# Patient Record
Sex: FEMALE | Race: WHITE | Employment: FULL TIME | URBAN - METROPOLITAN AREA
[De-identification: names, ages, dates, MRNs, and addresses within clinical notes are randomized per-mention and may not be internally consistent; named-entity substitution may affect disease eponyms.]

---

## 2017-01-23 ENCOUNTER — HOSPITAL ENCOUNTER (OUTPATIENT)
Dept: GENERAL RADIOLOGY | Age: 52
Discharge: HOME | End: 2017-01-23
Attending: INTERNAL MEDICINE
Payer: COMMERCIAL

## 2017-01-23 DIAGNOSIS — M54.50 LOW BACK PAIN: ICD-10-CM

## 2017-01-23 PROCEDURE — 72100 X-RAY EXAM L-S SPINE 2/3 VWS: CPT

## 2017-06-06 ENCOUNTER — HOSPITAL ENCOUNTER (OUTPATIENT)
Dept: GENERAL RADIOLOGY | Age: 52
Discharge: HOME | End: 2017-06-06
Payer: COMMERCIAL

## 2017-06-06 DIAGNOSIS — R05.9 COUGH: ICD-10-CM

## 2017-06-06 PROCEDURE — 71020 XR CHEST PA LAT: CPT

## 2018-04-20 ENCOUNTER — HOSPITAL ENCOUNTER (OUTPATIENT)
Dept: GENERAL RADIOLOGY | Age: 53
Discharge: HOME | End: 2018-04-20
Attending: INTERNAL MEDICINE
Payer: COMMERCIAL

## 2018-04-20 DIAGNOSIS — M53.3 PAIN IN THE COCCYX: ICD-10-CM

## 2018-04-20 PROCEDURE — 72220 X-RAY EXAM SACRUM TAILBONE: CPT

## 2019-01-25 ENCOUNTER — CLINICAL SUPPORT (OUTPATIENT)
Dept: CARDIOLOGY CLINIC | Age: 54
End: 2019-01-25

## 2019-01-25 DIAGNOSIS — R07.9 CHEST PAIN, UNSPECIFIED TYPE: ICD-10-CM

## 2019-01-25 DIAGNOSIS — R06.02 SOB (SHORTNESS OF BREATH): Primary | Chronic | ICD-10-CM

## 2019-01-30 ENCOUNTER — TELEPHONE (OUTPATIENT)
Dept: CARDIOLOGY CLINIC | Age: 54
End: 2019-01-30

## 2019-01-30 NOTE — TELEPHONE ENCOUNTER
Caryn called from [employer] stating she would like to receive the pt stress test results.   Phone #[phone]  Thanks

## 2019-01-30 NOTE — TELEPHONE ENCOUNTER
Faxed stress test results to Dr Zulema Ding at fax number [phone]. Fax confirmation received. Left message with Isai Lipoma that I will fax the results.

## 2022-04-20 ENCOUNTER — TRANSCRIBE ORDER (OUTPATIENT)
Dept: GENERAL RADIOLOGY | Age: 57
End: 2022-04-20

## 2022-04-20 DIAGNOSIS — R05.1 ACUTE COUGH: Primary | ICD-10-CM

## 2022-04-29 ENCOUNTER — TRANSCRIBE ORDER (OUTPATIENT)
Dept: GENERAL RADIOLOGY | Age: 57
End: 2022-04-29

## 2022-04-29 ENCOUNTER — HOSPITAL ENCOUNTER (OUTPATIENT)
Dept: GENERAL RADIOLOGY | Age: 57
Discharge: HOME | End: 2022-04-29
Attending: INTERNAL MEDICINE
Payer: COMMERCIAL

## 2022-04-29 DIAGNOSIS — R05.1 ACUTE COUGH: ICD-10-CM

## 2022-04-29 DIAGNOSIS — R05.1 ACUTE COUGH: Primary | ICD-10-CM

## 2022-04-29 PROCEDURE — 71046 X-RAY EXAM CHEST 2 VIEWS: CPT

## 2022-05-04 ENCOUNTER — TELEPHONE (OUTPATIENT)
Dept: CARDIOLOGY CLINIC | Age: 57
End: 2022-05-04

## 2022-05-04 NOTE — TELEPHONE ENCOUNTER
----- Message from Sanchez Edwards RN sent at 5/4/2022 11:18 AM EDT -----  Regarding: safemail  Patient's PCP, Dr. Eduardo Ryan, ordered a 1 week event monitor to be mailed to patient. Dx palpitations. Patient will be seeing Dr. Niesha Fofana as a new patient, but would like to go ahead and have monitor mailed. Thanks!    Jacquelyn Del Rio

## 2022-05-04 NOTE — TELEPHONE ENCOUNTER
Received fax referral order for 1 week heart monitor and kacy along with consult with cardiologist from patient's PCP, Dr. Hetal Diaz. Patient is already scheduled for an appointment in office to see Dr. Kiran Rowe as a new patient, but may not know she is not scheduled for any testing.     Future Appointments   Date Time Provider Claire Riggs   5/13/2022  9:00 AM MD [facility]

## 2022-05-05 NOTE — TELEPHONE ENCOUNTER
We are having monitor mailed to patient so hopefully will have some recordings by time of appointment. Stress test to be determined at NP appointment.

## 2022-05-05 NOTE — TELEPHONE ENCOUNTER
Spoke with patient and explained that we had orders for the heart moniter and kacy. Patient wants to keep appointment with Dr Patel Bashir and discuss if stress test is needed. Confirmed that patient does want to go ahead and have the monitor sent to her house.      Future Appointments   Date Time Provider Claire Riggs   5/13/2022  9:00 AM MD [facility]

## 2022-05-13 ENCOUNTER — OFFICE VISIT (OUTPATIENT)
Dept: CARDIOLOGY CLINIC | Age: 57
End: 2022-05-13
Payer: COMMERCIAL

## 2022-05-13 VITALS
HEIGHT: 67 IN | HEART RATE: 83 BPM | WEIGHT: 229 LBS | OXYGEN SATURATION: 97 % | DIASTOLIC BLOOD PRESSURE: 88 MMHG | SYSTOLIC BLOOD PRESSURE: 130 MMHG | BODY MASS INDEX: 35.94 KG/M2

## 2022-05-13 DIAGNOSIS — R07.89 SENSATION OF CHEST TIGHTNESS: ICD-10-CM

## 2022-05-13 DIAGNOSIS — R00.2 HEART PALPITATIONS: Primary | ICD-10-CM

## 2022-05-13 DIAGNOSIS — I45.10 RBBB: ICD-10-CM

## 2022-05-13 PROCEDURE — 99204 OFFICE O/P NEW MOD 45 MIN: CPT | Performed by: SPECIALIST

## 2022-05-13 RX ORDER — BUDESONIDE AND FORMOTEROL FUMARATE DIHYDRATE 160; 4.5 UG/1; UG/1
2 AEROSOL RESPIRATORY (INHALATION)
COMMUNITY

## 2022-05-13 RX ORDER — CHOLECALCIFEROL (VITAMIN D3) 125 MCG
CAPSULE ORAL
COMMUNITY

## 2022-05-13 RX ORDER — SEMAGLUTIDE 1.34 MG/ML
0.5 INJECTION, SOLUTION SUBCUTANEOUS
COMMUNITY

## 2022-05-13 RX ORDER — METFORMIN HYDROCHLORIDE 500 MG/1
500 TABLET ORAL
COMMUNITY

## 2022-05-13 RX ORDER — OMEPRAZOLE 20 MG/1
20 CAPSULE, DELAYED RELEASE ORAL DAILY
COMMUNITY

## 2022-05-13 NOTE — PROGRESS NOTES
Candance Hopes is a 64 y.o. female    Visit Vitals  /88 (BP 1 Location: Left upper arm, BP Patient Position: Sitting, BP Cuff Size: Adult)   Pulse 83   Ht 5' 7\" (1.702 m)   Wt 229 lb (103.9 kg)   SpO2 97%   BMI 35.87 kg/m²       Chief Complaint   Patient presents with    Palpitations       Chest pain YES OCCASIONAL  SOB YES  Dizziness NO  Swelling NO  Recent hospital visit NO  Refills NO  COVID VACCINE STATUS YES  HAD COVID?  YES

## 2022-05-13 NOTE — PROGRESS NOTES
HISTORY OF PRESENT ILLNESS  Clara Shannon is a 64 y.o. female. She is referred for evaluation of 2 episodes of palpitations with heaviness in her chest and shortness of breath. 2 weekends ago she walked a 5 km race. Towards the end of the race her legs felt very heavy and she has some tightness in her chest and shortness of breath and palpitations. She went home and relax and she was fine although the symptoms occurred the next day. She had an echocardiogram in  that was unremarkable and a Holter monitor has been ordered and she is wearing it currently. She has known right bundle branch block. She was recently started on vaginal estrogen suppositories for some hot flashes and diaphoresis. Her primary care physician stopped this medication after her recent symptoms and she has had no recurrence. She has a history of diabetes and was on medications with a hemoglobin A1c of 5.4. Medications were recently restarted for an increase in hemoglobin A1c up to 8.8. Her sister  here age 61 within the recent past.  Her lipids are borderline. I do have some early tracings from her monitor and they were unremarkable. HPI  Patient Active Problem List   Diagnosis Code    SOB (shortness of breath) R06.02    Anxiety F41.9    RBBB I45.10    Heart palpitations R00.2    Sensation of chest tightness R07.89     Past Medical History:   Diagnosis Date    Diabetes ([address]     Essential hypertension      History reviewed. No pertinent surgical history. Review of Systems   Respiratory: Positive for shortness of breath. Cardiovascular: Positive for chest pain and palpitations. All other systems reviewed and are negative. Visit Vitals  /88 (BP 1 Location: Left upper arm, BP Patient Position: Sitting, BP Cuff Size: Adult)   Pulse 83   Ht 5' 7\" (1.702 m)   Wt 229 lb (103.9 kg)   SpO2 97%   BMI 35.87 kg/m²       Physical Exam  Vitals and nursing note reviewed. HENT:      Head: Normocephalic.       Right Ear: External ear normal.      Left Ear: External ear normal.      Nose: Nose normal.      Mouth/Throat:      Mouth: Mucous membranes are moist.   Eyes:      Extraocular Movements: Extraocular movements intact. Cardiovascular:      Rate and Rhythm: Normal rate and regular rhythm. Heart sounds: Normal heart sounds. Pulmonary:      Breath sounds: Normal breath sounds. Abdominal:      Palpations: Abdomen is soft. Musculoskeletal:         General: Normal range of motion. Cervical back: Normal range of motion. Skin:     General: Skin is warm. Neurological:      Mental Status: She is alert and oriented to person, place, and time. Psychiatric:         Behavior: Behavior normal.         ASSESSMENT and PLAN  Her symptoms are somewhat atypical and did not occur with exertion but really at the end of her exertion or at rest the next day. They have resolved since stopping the estrogen vaginal suppositories. She is scheduled to walk another 5 km race tomorrow she does not run these races but only walks. I do not think she needs a stress test at this point but I have told her she may go ahead and walk the race tomorrow while wearing the monitor and we will call her about the results. I suspect that she was having some sort of side effects from the estrogen suppositories. I will see her back as needed should the monitor be unremarkable.

## 2022-05-20 ENCOUNTER — TELEPHONE (OUTPATIENT)
Dept: CARDIOLOGY CLINIC | Age: 57
End: 2022-05-20

## 2022-05-20 NOTE — TELEPHONE ENCOUNTER
Dr. Rosibel Jaramillo-  [facility] patient's end of service event monitor report to scanning. Only event was sinus tachycardia max . No future appointments.

## 2022-05-20 NOTE — LETTER
5/24/2022   Ms. Mian Jiménez  [address] [id number]              Dear Ms. Mian Jiménez,    Your heart monitor results did not show anything worrisome. Please call the office if you have any questions or would like to discuss further in office. A summary of your results is attached.            Sincerely,  MD Lillian Fernandez., RN

## 2023-01-23 ENCOUNTER — TRANSCRIBE ORDER (OUTPATIENT)
Dept: SCHEDULING | Age: 58
End: 2023-01-23

## 2023-01-23 DIAGNOSIS — Z82.49 FAMILY HISTORY OF HEART DISEASE: Primary | ICD-10-CM

## 2023-02-08 ENCOUNTER — HOSPITAL ENCOUNTER (OUTPATIENT)
Dept: CT IMAGING | Age: 58
Discharge: HOME | End: 2023-02-08
Attending: INTERNAL MEDICINE
Payer: SELF-PAY

## 2023-02-08 DIAGNOSIS — Z82.49 FAMILY HISTORY OF HEART DISEASE: ICD-10-CM

## 2023-02-08 PROCEDURE — 75571 CT HRT W/O DYE W/CA TEST: CPT

## 2023-05-26 ENCOUNTER — HOSPITAL ENCOUNTER (OUTPATIENT)
Age: 58
Discharge: HOME | End: 2023-05-26
Payer: COMMERCIAL

## 2023-05-26 DIAGNOSIS — R06.89 DYSPNEA AND RESPIRATORY ABNORMALITY: ICD-10-CM

## 2023-05-26 DIAGNOSIS — R06.00 DYSPNEA AND RESPIRATORY ABNORMALITY: ICD-10-CM

## 2023-05-26 PROCEDURE — 71046 X-RAY EXAM CHEST 2 VIEWS: CPT
